# Patient Record
Sex: FEMALE | Race: WHITE | NOT HISPANIC OR LATINO | Employment: FULL TIME | URBAN - METROPOLITAN AREA
[De-identification: names, ages, dates, MRNs, and addresses within clinical notes are randomized per-mention and may not be internally consistent; named-entity substitution may affect disease eponyms.]

---

## 2019-09-28 ENCOUNTER — TELEPHONE (OUTPATIENT)
Dept: HEALTH INFORMATION MANAGEMENT | Facility: OTHER | Age: 24
End: 2019-09-28

## 2020-01-24 ENCOUNTER — OFFICE VISIT (OUTPATIENT)
Dept: MEDICAL GROUP | Facility: MEDICAL CENTER | Age: 25
End: 2020-01-24
Payer: COMMERCIAL

## 2020-01-24 VITALS
HEIGHT: 66 IN | OXYGEN SATURATION: 99 % | HEART RATE: 83 BPM | BODY MASS INDEX: 23.46 KG/M2 | RESPIRATION RATE: 14 BRPM | WEIGHT: 146 LBS | SYSTOLIC BLOOD PRESSURE: 104 MMHG | TEMPERATURE: 98.7 F | DIASTOLIC BLOOD PRESSURE: 68 MMHG

## 2020-01-24 DIAGNOSIS — Z11.3 ROUTINE SCREENING FOR STI (SEXUALLY TRANSMITTED INFECTION): ICD-10-CM

## 2020-01-24 DIAGNOSIS — Z00.00 WELL ADULT EXAM: ICD-10-CM

## 2020-01-24 DIAGNOSIS — Z13.220 LIPID SCREENING: ICD-10-CM

## 2020-01-24 PROCEDURE — 99385 PREV VISIT NEW AGE 18-39: CPT | Performed by: FAMILY MEDICINE

## 2020-01-24 SDOH — HEALTH STABILITY: MENTAL HEALTH: HOW OFTEN DO YOU HAVE A DRINK CONTAINING ALCOHOL?: MONTHLY OR LESS

## 2020-01-24 ASSESSMENT — PATIENT HEALTH QUESTIONNAIRE - PHQ9: CLINICAL INTERPRETATION OF PHQ2 SCORE: 0

## 2020-01-25 NOTE — PROGRESS NOTES
Subjective:     CC:   Chief Complaint   Patient presents with   • Establish Care     establish care needs new pcp check up        HPI:   Maritza Chan is a 24 y.o. female who presents for annual exam. She is feeling well and denies any complaints.    Ob-Gyn/ History:    Patient has GYN provider: no  /Para:  0/0  Last Pap Smear:  never.    Gyn Surgery:  no.  Birth Control: none, not currently sexually active.   Last menstrual period:  19.  Periods regular. moderate bleeding. Cramping is moderate.   No significant bloating/fluid retention, pelvic pain, or dyspareunia. No vaginal discharge  Folate intake: no   Sexually activity: not currently.   Dyspareunia: no    Health Maintenance  Diet: pretty good   Exercise: exercises regularly   Substance Abuse: n/a   STI Screening: n/a  HIV Screening: planning to do today   Immunizations:    Influenza: declines   HPV:  Due     Tetanus: declines    Pneumococcal : n/a      Seat belts, bike helmet, gun safety discussed.  Sun protection used.    Cancer screening  Breast cancer: possible family history of breast cancer (maternal grandmother), no fam h/o uterine or ovarian cancer  Colorectal Cancer Screening: No family history of colon cancer.  Cervical cancer: due, has never had pap    She  has no past medical history on file.  She  has no past surgical history on file.    Family History   Problem Relation Age of Onset   • Mult Sclerosis Mother    • No Known Problems Father    • No Known Problems Brother    • Breast Cancer Maternal Grandmother    • Cancer Maternal Grandmother    • Cancer Paternal Grandmother         lung (smoker)   • Cancer Paternal Grandfather         lung (smoker)   • No Known Problems Brother        Social History     Socioeconomic History   • Marital status: Single     Spouse name: Not on file   • Number of children: Not on file   • Years of education: Not on file   • Highest education level: Not on file   Occupational History   • Not on file    Social Needs   • Financial resource strain: Not on file   • Food insecurity:     Worry: Not on file     Inability: Not on file   • Transportation needs:     Medical: Not on file     Non-medical: Not on file   Tobacco Use   • Smoking status: Never Smoker   • Smokeless tobacco: Never Used   Substance and Sexual Activity   • Alcohol use: Yes     Frequency: Monthly or less   • Drug use: Never   • Sexual activity: Not Currently     Partners: Male     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   Lifestyle   • Physical activity:     Days per week: Not on file     Minutes per session: Not on file   • Stress: Not on file   Relationships   • Social connections:     Talks on phone: Not on file     Gets together: Not on file     Attends Taoist service: Not on file     Active member of club or organization: Not on file     Attends meetings of clubs or organizations: Not on file     Relationship status: Not on file   • Intimate partner violence:     Fear of current or ex partner: Not on file     Emotionally abused: Not on file     Physically abused: Not on file     Forced sexual activity: Not on file   Other Topics Concern   • Not on file   Social History Narrative   • Not on file       There are no active problems to display for this patient.        No current outpatient medications on file.     No current facility-administered medications for this visit.      No Known Allergies    Review of Systems   Constitutional: Negative for fever, chills and malaise/fatigue.   HENT: Negative for congestion, sore throat, headache.    Eyes: Negative for double vision   Respiratory: Negative for cough and shortness of breath.    Cardiovascular: chest pain or palpitations  Gastrointestinal: Negative for nausea, vomiting, abdominal pain and diarrhea.   Genitourinary: Negative for dysuria and hematuria.   Skin: Negative for rash.   Neurological: Negative for dizziness, focal weakness and headaches.   Endo/Heme/Allergies: Does not  "bruise/bleed easily.   Psychiatric/Behavioral: Negative for depression or anxiety    Objective:     /68 (BP Location: Left arm, Patient Position: Sitting, BP Cuff Size: Adult)   Pulse 83   Temp 37.1 °C (98.7 °F) (Temporal)   Resp 14   Ht 1.676 m (5' 6\")   Wt 66.2 kg (146 lb)   LMP 01/01/2020   SpO2 99%   Breastfeeding? No   BMI 23.57 kg/m²   Body mass index is 23.57 kg/m².  Wt Readings from Last 4 Encounters:   01/24/20 66.2 kg (146 lb)       Physical Exam:  Constitutional: Well-developed and well-nourished. Not diaphoretic. No distress.   Skin: Skin is warm and dry. No rash noted.  Eyes: Conjunctivae and extraocular motions are normal. Pupils are equal, round, and reactive to light. No scleral icterus.   Ears:  External ears unremarkable. Tympanic membranes clear and intact.  Nose: Nares patent. No discharge.   Mouth/Throat: missing tooth on upper right   Oropharynx is clear and moist. Posterior pharynx without erythema or exudates.  Neck: Supple, trachea midline. Normal range of motion. No thyromegaly present. No lymphadenopathy--cervical or supraclavicular.  Cardiovascular: Regular rate and rhythm, S1 and S2 without murmur, rubs, or gallops.    Lungs: Normal inspiratory effort, CTA bilaterally, no wheezes/rhonchi/rales  Breast: deferred  Abdomen: Soft, non tender, and without distention. Active bowel sounds in all four quadrants. No rebound, guarding, masses or HSM.  : deferred  Extremities: No cyanosis, clubbing, erythema, nor edema. Distal pulses intact and symmetric.   Musculoskeletal: Normal gait and station.  Neurological: No focal deficits  Psychiatric:  Behavior, mood, and affect are appropriate.    Assessment and Plan:     1. Well adult exam     2. Routine screening for STI (sexually transmitted infection)  CHLAMYDIA/GC AMP URINE OR SWAB    HIV-1/HIV-2 SINGLE RESULT    RPR (SYPHILIS)   3. Lipid screening  Lipid Profile       HCM:     Labs per orders  Plan to f/u for pap smear and start " HPV vaccines.  Patient counseled about skin care, diet, supplements, prenatal vitamins, safe sex and exercise.    Follow-up: Return in about 4 weeks (around 2/21/2020) for for pap.    Please note that this dictation was created using voice recognition software. I have made every reasonable attempt to correct obvious errors, but I expect that there are errors of grammar and possibly content that I did not discover before finalizing the note.

## 2020-05-29 ENCOUNTER — OFFICE VISIT (OUTPATIENT)
Dept: MEDICAL GROUP | Facility: MEDICAL CENTER | Age: 25
End: 2020-05-29
Payer: COMMERCIAL

## 2020-05-29 VITALS
HEIGHT: 66 IN | BODY MASS INDEX: 23.37 KG/M2 | WEIGHT: 145.4 LBS | OXYGEN SATURATION: 99 % | SYSTOLIC BLOOD PRESSURE: 94 MMHG | DIASTOLIC BLOOD PRESSURE: 62 MMHG | HEART RATE: 83 BPM | TEMPERATURE: 98.4 F

## 2020-05-29 DIAGNOSIS — Z23 NEED FOR VACCINATION: ICD-10-CM

## 2020-05-29 DIAGNOSIS — N94.10 DYSPAREUNIA IN FEMALE: ICD-10-CM

## 2020-05-29 PROCEDURE — 99214 OFFICE O/P EST MOD 30 MIN: CPT | Mod: 25 | Performed by: FAMILY MEDICINE

## 2020-05-29 PROCEDURE — 90471 IMMUNIZATION ADMIN: CPT | Performed by: FAMILY MEDICINE

## 2020-05-29 PROCEDURE — 90715 TDAP VACCINE 7 YRS/> IM: CPT | Performed by: FAMILY MEDICINE

## 2020-05-30 NOTE — PROGRESS NOTES
"Subjective:     CC: Diagnoses of Need for vaccination and Dyspareunia in female were pertinent to this visit.    HPI: Patient is a 24 y.o. female established patient who presents today for well woman exam.  However, the patient became tearful and stated she would be unable to tolerate Pap smear.  She reports severe pain with any sort of penetration.      Dyspareunia in female  Patient reports that she has had severe pain with any penetration larger than the diameter of the finger for the past few years.  Last sexully active 8 yrs ago.  She denies any trauma.  Never had a pap  Unable to be sexually active.   She would be interested in physical therapy.  She states she would be unable to do a Pap today.    No past medical history on file.    Social History     Tobacco Use   • Smoking status: Never Smoker   • Smokeless tobacco: Never Used   Substance Use Topics   • Alcohol use: Yes     Frequency: Monthly or less   • Drug use: Never       No current Epic-ordered outpatient medications on file.     No current Epic-ordered facility-administered medications on file.        Allergies:  Patient has no known allergies.    Health Maintenance: Completed    ROS:    Pulm: no sob, no cough  CV: no chest pain, no palpitations        Objective:       Exam:  BP (!) 94/62 (BP Location: Right arm, Patient Position: Sitting, BP Cuff Size: Adult long)   Pulse 83   Temp 36.9 °C (98.4 °F) (Temporal)   Ht 1.676 m (5' 6\")   Wt 66 kg (145 lb 6.4 oz)   LMP 05/24/2020 (Approximate)   SpO2 99%   BMI 23.47 kg/m²  Body mass index is 23.47 kg/m².    General: Normal appearing. No distress.  HEAD: NCAT  EYES: conjunctiva clear, lids without ptosis, pupils equal and reactive to light  EARS: ears normal shape and contour.  MOUTH: normal dentition   Neck:  Normal ROM  Pulmonary: Normal effort. Normal respiratory rate.  Cardiovascular: Well perfused. No LE edema  Neurologic: Grossly normal, no focal deficits  Skin: Warm and dry.  No obvious " lesions.  Musculoskeletal: Normal gait and station.   Psych: Normal mood and affect. Alert and oriented x3. Judgment and insight is normal.      Assessment & Plan:     24 y.o. female with the following -     1. Dyspareunia in female  New to discuss.  The patient has severe pain with any penetration at the introitus.  She declined an exam today.  She is interested in physical therapy.  Referral placed today.  - REFERRAL TO PHYSICAL THERAPY Reason for Therapy: Eval/Treat/Report    2. Need for vaccination  - Tdap Vaccine =>8YO IMt    Plan to follow-up and do Pap smear when the patient is ready.    Please note that this dictation was created using voice recognition software. I have made every reasonable attempt to correct obvious errors, but I expect that there are errors of grammar and possibly content that I did not discover before finalizing the note.

## 2021-03-17 ENCOUNTER — HOSPITAL ENCOUNTER (OUTPATIENT)
Dept: LAB | Facility: MEDICAL CENTER | Age: 26
End: 2021-03-17
Payer: COMMERCIAL

## 2021-03-17 LAB
COVID ORDER STATUS COVID19: NORMAL
SARS-COV-2 RNA RESP QL NAA+PROBE: NOTDETECTED
SPECIMEN SOURCE: NORMAL

## 2022-10-15 ENCOUNTER — PHARMACY VISIT (OUTPATIENT)
Dept: PHARMACY | Facility: MEDICAL CENTER | Age: 27
End: 2022-10-15
Payer: COMMERCIAL

## 2022-10-15 PROCEDURE — RXMED WILLOW AMBULATORY MEDICATION CHARGE: Performed by: PHARMACIST

## 2022-10-15 RX ORDER — INFLUENZA A VIRUS A/BRISBANE/02/2018 IVR-190 (H1N1) ANTIGEN (FORMALDEHYDE INACTIVATED), INFLUENZA A VIRUS A/KANSAS/14/2017 X-327 (H3N2) ANTIGEN (FORMALDEHYDE INACTIVATED), INFLUENZA B VIRUS B/PHUKET/3073/2013 ANTIGEN (FORMALDEHYDE INACTIVATED), AND INFLUENZA B VIRUS B/MARYLAND/15/2016 BX-69A ANTIGEN (FORMALDEHYDE INACTIVATED) 15; 15; 15; 15 UG/.5ML; UG/.5ML; UG/.5ML; UG/.5ML
INJECTION, SUSPENSION INTRAMUSCULAR
Qty: 0.5 ML | Refills: 0 | OUTPATIENT
Start: 2022-10-05